# Patient Record
Sex: MALE | Race: WHITE | NOT HISPANIC OR LATINO | Employment: OTHER | URBAN - METROPOLITAN AREA
[De-identification: names, ages, dates, MRNs, and addresses within clinical notes are randomized per-mention and may not be internally consistent; named-entity substitution may affect disease eponyms.]

---

## 2017-01-04 ENCOUNTER — HOSPITAL ENCOUNTER (OUTPATIENT)
Facility: MEDICAL CENTER | Age: 67
End: 2017-01-04
Attending: PSYCHIATRY & NEUROLOGY
Payer: MEDICARE

## 2017-01-04 NOTE — PSYCHIATRY
"PSYCHIATRIC CONSULTATION:  Reason for consult: thoughts of self-harm   Requesting Physician:  Magali   Psychiatric Supervising Attending: Nader    Pt seen over telepsychiatry     Chief Complaint:   \"I am feeling better now.\"    HPI:  Mr. Alcantara is a 65 y/o man currently residing in an assisted living facility. History of bipolar disorder. He has intrusive thoughts of hurting himself on and off. He had the thought of pulling out his eyeballs. He has not acted on this thought. At one point he has acted on such thoughts but not within years per chart review. He has been seen by psych before and was very noticeably psychotic at that time. He currently has no evidence of delusions at all. He is totally coherent, which differs from psychiatric notes in the past. He has no desire to hurt himself. He denies hearing voices. He doesn't appear to be internally stimulated. He can tell me in detail how he would care for himself in his supported living environment. He has been observed in the ED x 6 days and has had no concerning behaviors. He has gotten frustrated at times and received ativan, but no threatening, no posturing, and no behavior agitated enough to concern staff. Staff reports he has been pleasant and coherent throughout his stay. Pt was placed on hold; it has since . Hold not extended/renewed per staff because he wasn't felt to have met criteria for legal hold.     Review of Current Systems:  Depression reports some depression at this time. Some low energy and anhedonia. No si.   Teresita  No signs/ symptoms teresita  Anxiety some anxiety related to be in the ed for prolonged period   Psychosis some delusional \"feelings\" of needing to pull out his eyes, which have resolved  PTSD  No signs/y symptoms   OCD  No signs/symptoms  Medical:  No active problems     MSE:  Vitals:  There were no vitals filed for this visit.    Musculoskeletal: no psychomotor agitation or retardation. +tremor in head particularly "   Appearance: well groomed, engaged, good eye contact   Thought Process: logical and sequential   Though Content: no evidence delusions.   Speech:  Nl tone, rate, volume   Mood: depressed  Affect: constricted  SI/HI: no si/hi   Attention:  Memory:  Orientation:  Insight and Judgment:     Neurological Testing:     Past Psych Hx:    Has been at [facility] for prolonged stay very recently   Has been on risperdal, prolixin, abilify     Family Psych Hx:  Was adopted. Biological father potentially has bipolar   Depression on biological mothers side.     Social Hx:  Has guardian. Very supportive family.     Drugs/Alcohol/Tobacco Hx:   Denies     Medical Hx:         Has had hx of pneumothorax/ rib fractures after being at [facility]   DM  HTN  CAD    Allergies:  Prolixin- gets stiff    Medications:   Atorvastatin  Benztropine .5mg qhs  Donepezil 10mg qhs  ASA 81 daily   Citalopram 20mg daily   Folic acid 1mg daily  Ergocalciferol 50,000 u q week     pepcid and zofran x 1 each   Ativan 1mg po tid prn, got it last night and once per day with mild effect  Prn senna/ docusate for constipation   Taking abilify at [place]  Appears he  Has been on depakote, clozaril, seroquel prolixin in martina past.   Labs/ Imaging:    Reviewed        Assessment:  Bipolar disorder   Current episode depressed, moderate, no psychosis        Plan:  Currently Mr. Alcantara does not meet criteria for a legal hold. In fact, he appears to be at his baseline. He is coping well with being in a small room in an ED for several days. He is having no thoughts of self harm, no thoughts of hurting others. His observed behaviors for multiple days has shown him to be pleasant, conversant, coherent, and cooperative. There is no legal basis to hold Mr. Alcantara. He is a little depressed, which would be appropriately treated as an outpatient with his regular psychiatrist.     Recommend he continue ativan at 1mg po bid in addition to abilify and celexa.     Recommend he discharge  home.     Thank you for this consult.         Psych will follow.   Thank you for the consult.

## 2021-03-03 DIAGNOSIS — Z23 NEED FOR VACCINATION: ICD-10-CM
